# Patient Record
Sex: FEMALE | Race: WHITE | NOT HISPANIC OR LATINO | Employment: FULL TIME | URBAN - METROPOLITAN AREA
[De-identification: names, ages, dates, MRNs, and addresses within clinical notes are randomized per-mention and may not be internally consistent; named-entity substitution may affect disease eponyms.]

---

## 2017-04-18 ENCOUNTER — ALLSCRIPTS OFFICE VISIT (OUTPATIENT)
Dept: OTHER | Facility: OTHER | Age: 47
End: 2017-04-18

## 2017-05-01 ENCOUNTER — ALLSCRIPTS OFFICE VISIT (OUTPATIENT)
Dept: OTHER | Facility: OTHER | Age: 47
End: 2017-05-01

## 2017-05-11 ENCOUNTER — TRANSCRIBE ORDERS (OUTPATIENT)
Dept: ADMINISTRATIVE | Facility: HOSPITAL | Age: 47
End: 2017-05-11

## 2017-05-11 ENCOUNTER — HOSPITAL ENCOUNTER (OUTPATIENT)
Dept: RADIOLOGY | Facility: HOSPITAL | Age: 47
Discharge: HOME | End: 2017-05-11
Attending: ORTHOPAEDIC SURGERY
Payer: OTHER MISCELLANEOUS

## 2017-05-11 ENCOUNTER — APPOINTMENT (OUTPATIENT)
Dept: PREADMISSION TESTING | Facility: HOSPITAL | Age: 47
End: 2017-05-11
Payer: OTHER MISCELLANEOUS

## 2017-05-11 ENCOUNTER — APPOINTMENT (OUTPATIENT)
Dept: LAB | Facility: HOSPITAL | Age: 47
End: 2017-05-11
Attending: ORTHOPAEDIC SURGERY
Payer: OTHER MISCELLANEOUS

## 2017-05-11 VITALS
WEIGHT: 293 LBS | SYSTOLIC BLOOD PRESSURE: 114 MMHG | HEIGHT: 65 IN | BODY MASS INDEX: 48.82 KG/M2 | DIASTOLIC BLOOD PRESSURE: 73 MMHG

## 2017-05-11 DIAGNOSIS — G56.02 CARPAL TUNNEL SYNDROME ON LEFT: ICD-10-CM

## 2017-05-11 DIAGNOSIS — G56.02 CARPAL TUNNEL SYNDROME ON LEFT: Primary | ICD-10-CM

## 2017-05-11 LAB
ANION GAP SERPL CALCULATED.3IONS-SCNC: 5 MMOL/L (ref 4–13)
BASOPHILS # BLD AUTO: 0.03 THOUSANDS/ΜL (ref 0–0.1)
BASOPHILS NFR BLD AUTO: 0 % (ref 0–1)
BUN SERPL-MCNC: 11 MG/DL (ref 5–25)
CALCIUM SERPL-MCNC: 9 MG/DL (ref 8.3–10.1)
CHLORIDE SERPL-SCNC: 103 MMOL/L (ref 100–108)
CO2 SERPL-SCNC: 31 MMOL/L (ref 21–32)
CREAT SERPL-MCNC: 0.59 MG/DL (ref 0.6–1.3)
EOSINOPHIL # BLD AUTO: 0.33 THOUSAND/ΜL (ref 0–0.61)
EOSINOPHIL NFR BLD AUTO: 4 % (ref 0–6)
ERYTHROCYTE [DISTWIDTH] IN BLOOD BY AUTOMATED COUNT: 13.8 % (ref 11.6–15.1)
GFR SERPL CREATININE-BSD FRML MDRD: >60 ML/MIN/1.73SQ M
GLUCOSE P FAST SERPL-MCNC: 172 MG/DL (ref 65–99)
HCT VFR BLD AUTO: 42.4 % (ref 34.8–46.1)
HGB BLD-MCNC: 13.9 G/DL (ref 11.5–15.4)
LYMPHOCYTES # BLD AUTO: 2.1 THOUSANDS/ΜL (ref 0.6–4.47)
LYMPHOCYTES NFR BLD AUTO: 23 % (ref 14–44)
MCH RBC QN AUTO: 28.5 PG (ref 26.8–34.3)
MCHC RBC AUTO-ENTMCNC: 32.8 G/DL (ref 31.4–37.4)
MCV RBC AUTO: 87 FL (ref 82–98)
MONOCYTES # BLD AUTO: 0.61 THOUSAND/ΜL (ref 0.17–1.22)
MONOCYTES NFR BLD AUTO: 7 % (ref 4–12)
NEUTROPHILS # BLD AUTO: 6.15 THOUSANDS/ΜL (ref 1.85–7.62)
NEUTS SEG NFR BLD AUTO: 66 % (ref 43–75)
PLATELET # BLD AUTO: 247 THOUSANDS/UL (ref 149–390)
PMV BLD AUTO: 8.8 FL (ref 8.9–12.7)
POTASSIUM SERPL-SCNC: 4.2 MMOL/L (ref 3.5–5.3)
RBC # BLD AUTO: 4.87 MILLION/UL (ref 3.81–5.12)
SODIUM SERPL-SCNC: 139 MMOL/L (ref 136–145)
WBC # BLD AUTO: 9.22 THOUSAND/UL (ref 4.31–10.16)

## 2017-05-11 PROCEDURE — 85025 COMPLETE CBC W/AUTO DIFF WBC: CPT

## 2017-05-11 PROCEDURE — 36415 COLL VENOUS BLD VENIPUNCTURE: CPT

## 2017-05-11 PROCEDURE — 71020 HB CHEST X-RAY 2VW FRONTAL&LATL: CPT

## 2017-05-11 PROCEDURE — 80048 BASIC METABOLIC PNL TOTAL CA: CPT

## 2017-05-11 RX ORDER — ACETAMINOPHEN, ASPIRIN AND CAFFEINE 250; 250; 65 MG/1; MG/1; MG/1
2 TABLET, FILM COATED ORAL EVERY 8 HOURS PRN
COMMUNITY

## 2017-05-11 RX ORDER — DIPHENHYDRAMINE HCL 25 MG
25 CAPSULE ORAL
COMMUNITY

## 2017-05-17 ENCOUNTER — ALLSCRIPTS OFFICE VISIT (OUTPATIENT)
Dept: OTHER | Facility: OTHER | Age: 47
End: 2017-05-17

## 2017-05-17 DIAGNOSIS — E11.65 TYPE 2 DIABETES MELLITUS WITH HYPERGLYCEMIA (HCC): ICD-10-CM

## 2017-05-17 DIAGNOSIS — Z47.89 ENCOUNTER FOR OTHER ORTHOPEDIC AFTERCARE: ICD-10-CM

## 2017-05-17 DIAGNOSIS — R73.01 IMPAIRED FASTING GLUCOSE: ICD-10-CM

## 2017-05-18 ENCOUNTER — GENERIC CONVERSION - ENCOUNTER (OUTPATIENT)
Dept: OTHER | Facility: OTHER | Age: 47
End: 2017-05-18

## 2017-05-18 ENCOUNTER — LAB CONVERSION - ENCOUNTER (OUTPATIENT)
Dept: OTHER | Facility: OTHER | Age: 47
End: 2017-05-18

## 2017-05-18 ENCOUNTER — ALLSCRIPTS OFFICE VISIT (OUTPATIENT)
Dept: OTHER | Facility: OTHER | Age: 47
End: 2017-05-18

## 2017-05-18 LAB — HBA1C MFR BLD HPLC: 8.1 % OF TOTAL HGB

## 2017-05-26 ENCOUNTER — ANESTHESIA (OUTPATIENT)
Dept: PERIOP | Facility: HOSPITAL | Age: 47
End: 2017-05-26
Payer: OTHER MISCELLANEOUS

## 2017-05-26 ENCOUNTER — HOSPITAL ENCOUNTER (OUTPATIENT)
Facility: HOSPITAL | Age: 47
Setting detail: OUTPATIENT SURGERY
Discharge: HOME | End: 2017-05-26
Attending: ORTHOPAEDIC SURGERY | Admitting: ORTHOPAEDIC SURGERY
Payer: OTHER MISCELLANEOUS

## 2017-05-26 ENCOUNTER — ANESTHESIA EVENT (OUTPATIENT)
Dept: PERIOP | Facility: HOSPITAL | Age: 47
End: 2017-05-26
Payer: OTHER MISCELLANEOUS

## 2017-05-26 VITALS
TEMPERATURE: 97.7 F | HEIGHT: 65 IN | RESPIRATION RATE: 16 BRPM | DIASTOLIC BLOOD PRESSURE: 58 MMHG | BODY MASS INDEX: 48.82 KG/M2 | WEIGHT: 293 LBS | HEART RATE: 86 BPM | OXYGEN SATURATION: 98 % | SYSTOLIC BLOOD PRESSURE: 129 MMHG

## 2017-05-26 LAB — EXT PREGNANCY TEST URINE: NEGATIVE

## 2017-05-26 PROCEDURE — 81025 URINE PREGNANCY TEST: CPT | Performed by: ORTHOPAEDIC SURGERY

## 2017-05-26 RX ORDER — SODIUM CHLORIDE, SODIUM LACTATE, POTASSIUM CHLORIDE, CALCIUM CHLORIDE 600; 310; 30; 20 MG/100ML; MG/100ML; MG/100ML; MG/100ML
20 INJECTION, SOLUTION INTRAVENOUS CONTINUOUS
Status: DISCONTINUED | OUTPATIENT
Start: 2017-05-26 | End: 2017-05-26 | Stop reason: HOSPADM

## 2017-05-26 RX ORDER — GLYCOPYRROLATE 0.2 MG/ML
INJECTION INTRAMUSCULAR; INTRAVENOUS AS NEEDED
Status: DISCONTINUED | OUTPATIENT
Start: 2017-05-26 | End: 2017-05-26 | Stop reason: SURG

## 2017-05-26 RX ORDER — FENTANYL CITRATE/PF 50 MCG/ML
25 SYRINGE (ML) INJECTION
Status: DISCONTINUED | OUTPATIENT
Start: 2017-05-26 | End: 2017-05-26 | Stop reason: HOSPADM

## 2017-05-26 RX ORDER — FENTANYL CITRATE 50 UG/ML
INJECTION, SOLUTION INTRAMUSCULAR; INTRAVENOUS AS NEEDED
Status: DISCONTINUED | OUTPATIENT
Start: 2017-05-26 | End: 2017-05-26 | Stop reason: SURG

## 2017-05-26 RX ORDER — LIDOCAINE HYDROCHLORIDE 5 MG/ML
INJECTION, SOLUTION INFILTRATION; PERINEURAL AS NEEDED
Status: DISCONTINUED | OUTPATIENT
Start: 2017-05-26 | End: 2017-05-26 | Stop reason: SURG

## 2017-05-26 RX ORDER — ONDANSETRON 2 MG/ML
INJECTION INTRAMUSCULAR; INTRAVENOUS AS NEEDED
Status: DISCONTINUED | OUTPATIENT
Start: 2017-05-26 | End: 2017-05-26 | Stop reason: SURG

## 2017-05-26 RX ORDER — PROPOFOL 10 MG/ML
INJECTION, EMULSION INTRAVENOUS AS NEEDED
Status: DISCONTINUED | OUTPATIENT
Start: 2017-05-26 | End: 2017-05-26 | Stop reason: SURG

## 2017-05-26 RX ORDER — KETAMINE HYDROCHLORIDE 50 MG/ML
INJECTION, SOLUTION, CONCENTRATE INTRAMUSCULAR; INTRAVENOUS AS NEEDED
Status: DISCONTINUED | OUTPATIENT
Start: 2017-05-26 | End: 2017-05-26 | Stop reason: SURG

## 2017-05-26 RX ORDER — MIDAZOLAM HYDROCHLORIDE 1 MG/ML
INJECTION INTRAMUSCULAR; INTRAVENOUS AS NEEDED
Status: DISCONTINUED | OUTPATIENT
Start: 2017-05-26 | End: 2017-05-26 | Stop reason: SURG

## 2017-05-26 RX ORDER — TRAMADOL HYDROCHLORIDE 50 MG/1
50 TABLET ORAL EVERY 6 HOURS SCHEDULED
Status: DISCONTINUED | OUTPATIENT
Start: 2017-05-26 | End: 2017-05-26 | Stop reason: HOSPADM

## 2017-05-26 RX ORDER — ACETAMINOPHEN 325 MG/1
650 TABLET ORAL EVERY 6 HOURS PRN
Status: DISCONTINUED | OUTPATIENT
Start: 2017-05-26 | End: 2017-05-26 | Stop reason: HOSPADM

## 2017-05-26 RX ORDER — KETOROLAC TROMETHAMINE 30 MG/ML
30 INJECTION, SOLUTION INTRAMUSCULAR; INTRAVENOUS ONCE
Status: COMPLETED | OUTPATIENT
Start: 2017-05-26 | End: 2017-05-26

## 2017-05-26 RX ORDER — ACETAMINOPHEN AND CODEINE PHOSPHATE 300; 30 MG/1; MG/1
1-2 TABLET ORAL EVERY 6 HOURS PRN
Qty: 15 TABLET | Refills: 0 | Status: SHIPPED | OUTPATIENT
Start: 2017-05-26 | End: 2017-06-05

## 2017-05-26 RX ORDER — ONDANSETRON 2 MG/ML
4 INJECTION INTRAMUSCULAR; INTRAVENOUS ONCE
Status: DISCONTINUED | OUTPATIENT
Start: 2017-05-26 | End: 2017-05-26 | Stop reason: HOSPADM

## 2017-05-26 RX ORDER — ONDANSETRON 2 MG/ML
4 INJECTION INTRAMUSCULAR; INTRAVENOUS EVERY 6 HOURS PRN
Status: DISCONTINUED | OUTPATIENT
Start: 2017-05-26 | End: 2017-05-26 | Stop reason: HOSPADM

## 2017-05-26 RX ADMIN — MIDAZOLAM HYDROCHLORIDE 4 MG: 1 INJECTION, SOLUTION INTRAMUSCULAR; INTRAVENOUS at 07:25

## 2017-05-26 RX ADMIN — FENTANYL CITRATE 100 MCG: 50 INJECTION, SOLUTION INTRAMUSCULAR; INTRAVENOUS at 07:25

## 2017-05-26 RX ADMIN — KETOROLAC TROMETHAMINE 30 MG: 30 INJECTION, SOLUTION INTRAMUSCULAR at 08:22

## 2017-05-26 RX ADMIN — PROPOFOL 50 MG: 10 INJECTION, EMULSION INTRAVENOUS at 07:45

## 2017-05-26 RX ADMIN — SODIUM CHLORIDE, SODIUM LACTATE, POTASSIUM CHLORIDE, AND CALCIUM CHLORIDE: .6; .31; .03; .02 INJECTION, SOLUTION INTRAVENOUS at 07:15

## 2017-05-26 RX ADMIN — ONDANSETRON 4 MG: 2 INJECTION INTRAMUSCULAR; INTRAVENOUS at 07:51

## 2017-05-26 RX ADMIN — KETAMINE HYDROCHLORIDE 25 MG: 50 INJECTION INTRAMUSCULAR; INTRAVENOUS at 07:45

## 2017-05-26 RX ADMIN — SODIUM CHLORIDE, SODIUM LACTATE, POTASSIUM CHLORIDE, AND CALCIUM CHLORIDE 20 ML/HR: .6; .31; .03; .02 INJECTION, SOLUTION INTRAVENOUS at 06:45

## 2017-05-26 RX ADMIN — FENTANYL CITRATE 100 MCG: 50 INJECTION, SOLUTION INTRAMUSCULAR; INTRAVENOUS at 07:45

## 2017-05-26 RX ADMIN — CEFAZOLIN SODIUM 1000 MG: 1 SOLUTION INTRAVENOUS at 07:20

## 2017-05-26 RX ADMIN — TRAMADOL HYDROCHLORIDE 50 MG: 50 TABLET, FILM COATED ORAL at 08:19

## 2017-05-26 RX ADMIN — GLYCOPYRROLATE 0.2 MG: 0.2 INJECTION, SOLUTION INTRAMUSCULAR; INTRAVENOUS at 07:46

## 2017-05-26 RX ADMIN — PROPOFOL 50 MG: 10 INJECTION, EMULSION INTRAVENOUS at 07:30

## 2017-05-26 RX ADMIN — LIDOCAINE HYDROCHLORIDE 40 ML: 5 INJECTION, SOLUTION INFILTRATION; PERINEURAL at 07:29

## 2017-05-26 RX ADMIN — KETAMINE HYDROCHLORIDE 25 MG: 50 INJECTION INTRAMUSCULAR; INTRAVENOUS at 07:40

## 2017-06-08 ENCOUNTER — ALLSCRIPTS OFFICE VISIT (OUTPATIENT)
Dept: OTHER | Facility: OTHER | Age: 47
End: 2017-06-08

## 2017-07-10 ENCOUNTER — ALLSCRIPTS OFFICE VISIT (OUTPATIENT)
Dept: OTHER | Facility: OTHER | Age: 47
End: 2017-07-10

## 2017-07-10 DIAGNOSIS — M79.645 PAIN OF FINGER OF LEFT HAND: ICD-10-CM

## 2017-07-10 DIAGNOSIS — Z47.89 ENCOUNTER FOR OTHER ORTHOPEDIC AFTERCARE: ICD-10-CM

## 2017-08-04 ENCOUNTER — ALLSCRIPTS OFFICE VISIT (OUTPATIENT)
Dept: OTHER | Facility: OTHER | Age: 47
End: 2017-08-04

## 2017-08-04 ENCOUNTER — APPOINTMENT (OUTPATIENT)
Dept: RADIOLOGY | Facility: CLINIC | Age: 47
End: 2017-08-04
Payer: OTHER MISCELLANEOUS

## 2017-08-04 DIAGNOSIS — M79.645 PAIN OF FINGER OF LEFT HAND: ICD-10-CM

## 2017-08-04 PROCEDURE — 73140 X-RAY EXAM OF FINGER(S): CPT

## 2017-08-14 DIAGNOSIS — Z47.89 ENCOUNTER FOR OTHER ORTHOPEDIC AFTERCARE: ICD-10-CM

## 2017-09-18 ENCOUNTER — ALLSCRIPTS OFFICE VISIT (OUTPATIENT)
Dept: OTHER | Facility: OTHER | Age: 47
End: 2017-09-18

## 2017-09-18 DIAGNOSIS — G56.22 LESION OF LEFT ULNAR NERVE: ICD-10-CM

## 2017-09-27 ENCOUNTER — APPOINTMENT (OUTPATIENT)
Dept: PHYSICAL THERAPY | Facility: CLINIC | Age: 47
End: 2017-09-27
Payer: OTHER MISCELLANEOUS

## 2017-09-27 ENCOUNTER — GENERIC CONVERSION - ENCOUNTER (OUTPATIENT)
Dept: OTHER | Facility: OTHER | Age: 47
End: 2017-09-27

## 2017-09-27 DIAGNOSIS — G56.22 LESION OF LEFT ULNAR NERVE: ICD-10-CM

## 2017-09-27 PROCEDURE — G8986 CARRY D/C STATUS: HCPCS

## 2017-09-27 PROCEDURE — G8985 CARRY GOAL STATUS: HCPCS

## 2017-09-27 PROCEDURE — L3702 EO W/O JOINTS CF: HCPCS

## 2017-09-27 PROCEDURE — G8984 CARRY CURRENT STATUS: HCPCS

## 2017-10-23 ENCOUNTER — GENERIC CONVERSION - ENCOUNTER (OUTPATIENT)
Dept: OTHER | Facility: OTHER | Age: 47
End: 2017-10-23

## 2017-11-20 ENCOUNTER — ALLSCRIPTS OFFICE VISIT (OUTPATIENT)
Dept: OTHER | Facility: OTHER | Age: 47
End: 2017-11-20

## 2017-11-21 NOTE — PROGRESS NOTES
Assessment    1  Cubital tunnel syndrome on left (354 2) (G56 22)   2  Thumb pain, left (729 5) (G82 721)    Plan  Cubital tunnel syndrome on left    · EMG ONE EXTREMITY WITH OR W/O RELATED PARASPINAL AREAS; Status:Hold For- Scheduling; Requested for:20Nov2017;   ONE : LUE  Thumb pain, left    · * MRI HAND LEFT WO CONTRAST; Status:Need Information - Financial Authorization; Requested for:20Nov2017;    · * MRI WRIST LEFT WO CONTRAST; Status:Need Information - Financial Authorization; Requested for:20Nov2017;     Discussion/Summary    At this time, patient continues to work with no restrictions  will obtain a repeat EMG of the left upper extremity as well as a left MRI of the hand and wristif condition worsensas needed pending EMG and MRI for reevaluation  attestation:  Tyler Hernandez am acting as a scribe while in the presence of the attending physician  Johnnie Mcwilliams MD, personally performed the services described in this documentation as scribed in my presence  Chief Complaint    1  Hand Problem  Patient for follow-up numbness tingling of her left hand times several months      History of Present Illness  HPI: Patient is a 60-year-old right-hand dominant, female who presents for follow-up numbness and tingling of her left hand, ring and small finger times several months  She was last seen in the office for the same condition on 10/18/17, and diagnosed with cubital tunnel syndrome on the left, primarily of left wrist osteoarthritis and left thumb pain  She continues to complain of left pillar pain and numbness and tingling in her left ring and small finger  She has completed occupational therapy, as well as, left elbow bracing with little to no relief of symptoms  is significant for left carpal tunnel release in May 2017 by Dr Janelle Guerin, surgical, family, and social history as well as medications and allergies were reviewed  Updates as needed were performed   Review of systems was recorded on a separate intake sheet, this was reviewed as well  Review of Systems    ROS reviewed  Active Problems    1  Aftercare following surgery of the musculoskeletal system (V58 78) (Z47 89)   2  Cubital tunnel syndrome on left (354 2) (G56 22)   3  Localized primary osteoarthritis of left wrist (715 13) (M19 032)   4  Thumb pain, left (729 5) (M79 645)   5  Uncontrolled type 2 diabetes mellitus without complication, without long-term current use of insulin (250 02) (E11 65)    Current Meds   1  Benadryl Allergy 25 MG Oral Tablet; TAKE 1 TABLET AT BEDTIME; Therapy: 14NVS5808 to Recorded   2  Calcium Carbonate Antacid 500 MG Oral Tablet Chewable; TAKE AS DIRECTED; Therapy: 51MQP1253 to Recorded   3  Excedrin Migraine 250-250-65 MG Oral Tablet; TAKE 1 TABLET 3 TIMES DAILY AS NEEDED; Therapy: 29FLH8977 to Recorded   4  Lifescan One Touch Ultra Test Strips; TESTING THREE TIMES DAILY; Therapy: 70ZFX3716 to (Last Rx:19May2017)  Requested for: 41XPU6768 Ordered   5  Lifescan One Touch Ultramini Meter; 1 meter testing three times daily; Therapy: 23FVJ9972 to (Last Rx:19May2017)  Requested for: 80VLY4192 Ordered   6  MetFORMIN HCl - 850 MG Oral Tablet; Take 1 tablet twice daily; Therapy: 64UIN0278 to (Evaluate:18Nov2017)  Requested for: 39UIE4199; Last Rx:47Rvd0315 Ordered   7  Pain Relief Oral Tablet; Therapy: 61Dpv4011 to Recorded   8  TRUEplus Lancets 28G Miscellaneous; TESTING THREE TIMES DAILY; Therapy: 18WFS9222 to (Last Rx:19May2017)  Requested for: 16HKF6281 Ordered    Allergies  1  No Known Drug Allergies    Vitals  Signs     Heart Rate: 98  Systolic: 268  Diastolic: 85  Height: 5 ft 6 in  Weight: 340 lb 4 oz  BMI Calculated: 54 92  BSA Calculated: 2 51    Physical Exam     General: No acute distress, age-appropriate  Neck: Supple, trachea midline  HEENT: Normocephalic atraumatic, mucous membranes are moist, sclera are nonicteric  Cardiovascular: No discernable arrhythmia    Respiratory: Breathing is even and unlabored, no stridor or audible wheezing  Cervical Spine: was evaluated and demonstrated: Full Range of Motion  Negative Spurlings, Negative Lhermitte's, Negative Salazar's  No C5 , No C6 , No C7, No C8 weaknesses  L Wrist Examination:  Full range of motion at the wrist  2+ radial and ulnar pulses    Left Cubital Tunnel: Tinelâs at Elbow, but Negative Subluxing Ulnar Nerve, No Weakness Intrinsics, No Hypothenar Atrophy, No Clawing and No Atrophy to First dorsal interosseous  Carpal Tunnel Examination-       Signatures   Electronically signed by : AJ Smith; Nov 20 2017  4:16PM EST                       (Author)    Electronically signed by : HAROON Mendiola ; Nov 20 2017  4:21PM EST                       (Author)

## 2018-01-08 ENCOUNTER — ALLSCRIPTS OFFICE VISIT (OUTPATIENT)
Dept: OTHER | Facility: OTHER | Age: 48
End: 2018-01-08

## 2018-01-09 NOTE — PROGRESS NOTES
Assessment   1  Thumb pain, left (729 5) (W71 172)   2  Localized primary osteoarthritis of left wrist (715 13) (L15 297)    Plan   Localized primary osteoarthritis of left wrist    · Wrist splint; Status:Complete;   Done: 79ECE2580    Discussion/Summary      Jodi Mccoy continues with discomfort at the base of the left thumb  It is likely that she has had significant improvement in her median neuropathy and is now experiencing the osteoarthritic type pain at the base of the left thumb  Conservative versus surgical treatment options including over-the-counter nonsteroidals, cortisone injection, joint arthroplasty/fusion and arthroscopic debridement was fairly discussed with her  At this point recommendations are made for conservative measures as Britney has not yet had any treatment localized to her left Aia 16 joint  She wishes to obtain a comfort form brace today for her left thumb  She will wear this as tolerated and will begin over-the-counter nonsteroidal anti-inflammatories  Leave her follow-up open ended as she understands the treatment options available to her pending her pain and discomfort level  Ruperto Bekcford, acted as scribe during today's visit the present of the attending surgeon  Attestation:   Chacorta Romero MD, personally performed the services described in this documentation as scribed in my presence  Chief Complaint   Left thumb pain      History of Present Illness   HPI: 81-year-old female who is here for evaluation of her left hand  She is status post left carpal tunnel release that was done in May 2017 by Dr Loraine Morel  Prior to that surgery she was having whole hand pain and numbness  She followed up appropriately with Dr Loraine Morel but over the course of months continues to have aching at the base of the left thumb  She also has occasional tingling in the fourth and fifth fingers  An EMG and an MRI were both ordered and she was sent here upon Dr Loraine Morel 's recommendation for further evaluation  She is back to work with pain  Review of Systems        Constitutional: No fever, no chills, feels well, no tiredness, no recent weight gain or loss  Eyes: No complaints of eyesight problems, no red eyes  ENT: no loss of hearing, no nosebleeds, no sore throat  Cardiovascular: No complaints of chest pain, no palpitations, no leg claudication or lower extremity edema  Respiratory: no compliants of shortness of breath, no wheezing, no cough  Gastrointestinal: no complaints of abdominal pain, no constipation, no nausea or diarrhea, no vomiting, no bloody stools  Genitourinary: no complaints of dysuria, no incontinence  Musculoskeletal: as noted in HPI  Integumentary: no complaints of skin rash or lesion, no itching or dry skin, no skin wounds  Neurological: no complaints of headache, no confusion, no numbness or tingling, no dizziness  Endocrine: No complaints of muscle weakness, no feelings of weakness, no frequent urination, no excessive thirst       Psychiatric: No suicidal thoughts, no anxiety, no feelings of depression  ROS reviewed  Active Problems   1  Aftercare following surgery of the musculoskeletal system (V58 78) (Z47 89)   2  Cubital tunnel syndrome on left (354 2) (G56 22)   3  Localized primary osteoarthritis of left wrist (715 13) (M19 032)   4  Thumb pain, left (729 5) (M79 645)   5  Uncontrolled type 2 diabetes mellitus without complication, without long-term current use     of insulin (250 02) (E11 65)    Past Medical History      The active problems and past medical history were reviewed and updated today  Surgical History      The surgical history was reviewed and updated today  Family History      The family history was reviewed and updated today  Social History   The social history was reviewed and updated today  Current Meds    1  Benadryl Allergy 25 MG Oral Tablet; TAKE 1 TABLET AT BEDTIME;      Therapy: 37WYM1840 to Recorded   2  Calcium Carbonate Antacid 500 MG Oral Tablet Chewable; TAKE AS DIRECTED; Therapy: 67NYR8305 to Recorded   3  Excedrin Migraine 250-250-65 MG Oral Tablet; TAKE 1 TABLET 3 TIMES DAILY AS     NEEDED; Therapy: 80EZF5978 to Recorded   4  Lifescan One Touch Ultra Test Strips; TESTING THREE TIMES DAILY; Therapy: 70MVX5846 to (Last Rx:19May2017)  Requested for: 01BFU2242 Ordered   5  Lifescan One Touch Ultramini Meter; 1 meter testing three times daily; Therapy: 50JOX2057 to (Last Rx:19May2017)  Requested for: 00XWN6530 Ordered   6  MetFORMIN HCl - 850 MG Oral Tablet; Take 1 tablet twice daily; Therapy: 46XIR9728 to (Evaluate:18Nov2017)  Requested for: 39IMN8940; Last     Rx:22May2017 Ordered   7  Pain Relief Oral Tablet; Therapy: 59Ckl4141 to Recorded   8  TRUEplus Lancets 28G Miscellaneous; TESTING THREE TIMES DAILY; Therapy: 77YWP9144 to (Last Rx:19May2017)  Requested for: 41AEL8736 Ordered     The medication list was reviewed and updated today  Allergies   1  No Known Drug Allergies    Vitals   Signs   Heart Rate: 93  Systolic: 035  Diastolic: 99  Height: 5 ft 6 in  Weight: 336 lb 2 oz  BMI Calculated: 54 25  BSA Calculated: 2 49    Physical Exam           General: No acute distress, age-appropriate  No increased work of breathing, no appreciable dysrhythmias, skin is intact, mood and affect are appropriate  L Elbow: was evaluated and demonstrated : Full Range of Motion, Negative Pivot Shift Test, Negative Milking Test, Negative Valgus Hyperextension Test, Negative Hangar Sign  No Valgus instability, No Varus Instability, No Posteromedial Instability, No Posterolateral Instability  No Olecranon Bursitis, No Crepitus, No Tenderness  R Elbow: was evaluated and demonstrated : Full Range of Motion, Negative Pivot Shift Test, Negative Milking Test, Negative Valgus Hyperextension Test, Negative Hangar Sign   No Valgus instability, No Varus Instability, No Posteromedial Instability, No Posterolateral Instability  No Olecranon Bursitis, No Crepitus, No Tenderness  L Wrist Examination:      Left wrist exam reveals full range of motion without pain  There is tenderness along the left CMC joint to palpation and minimal tenderness over the anatomical snuffbox  Negative grind  Negative Tinel's to the carpal tunnel and mild reproducible pain into the left thumb with Phalen's/compression over the median nerve  Left Cubital Tunnel: was evaluated and demonstrated: Negative Tinelâs at the elbow, negative Tinelâs at Guyonâs canal in the wrist, negative Elbow flexion/compression test, no subluxation of the ulnar nerve, no weakness of the FDP to ring/small, Full strength to the intrinsics, full strength to the first dorsal interosseous, negative Fromentâs sign, negative Jeaneâs sign, No hypothenar atrophy, No tenderness at [facility], Negative wartenburgâs sign, no clawing, no atrophy to the first dorsal interosseous muscle, no atrophy to the intrinsic muscles  Skin: was evaluated and demonstrated no masses, no abrasions, no erythema, no effusion, no edema, no fluctuance, no induration, no laceration, no ulceration, no lymphadenopathy  Left Upper Neurovascular: were evaluated and demonstrated: The patient has normal motor strength to the median, ulnar and radial nerve  Normal sensation to the median, ulnar, and radial nerve  Normal pulses in the radial and ulnar artery  Capillary refill is < 2 seconds  Results/Data   I personally reviewed the films/images/results in the office today  My interpretation follows  MRI Review MRI of the left wrist and hand does confirm a mild to moderate carpometacarpal osteoarthritis at the base of the thumb  As well as a cyst in the distal scaphoid  Diagnostic Review Recent MRI from December 2017 was compared to EMG done in April prior to carpal tunnel release   There is definite improvement in all measurements including motor and sensory median latency and amplitude  No evidence of cubital tunnel or ulnar neuropathy is present        Signatures    Electronically signed by : LIUDMILA Hernandez; Jan 8 2018  2:57PM EST                       (/Recorder)     Electronically signed by : HAROON Park ; Jan 8 2018  3:04PM EST                       (Author)

## 2018-01-11 NOTE — RESULT NOTES
Verified Results  (Q) HEMOGLOBIN A1c 32CPK7610 02:21PM Raizatenzin Puneet   REPORT COMMENT:  FASTING:NO     Test Name Result Flag Reference   HEMOGLOBIN A1c 8 1 % of total Hgb H <5 7   For someone without known diabetes, a hemoglobin A1c  value of 6 5% or greater indicates that they may have   diabetes and this should be confirmed with a follow-up   test      For someone with known diabetes, a value <7% indicates   that their diabetes is well controlled and a value   greater than or equal to 7% indicates suboptimal   control  A1c targets should be individualized based on   duration of diabetes, age, comorbid conditions, and   other considerations  Currently, no consensus exists regarding use of  hemoglobin A1c for diagnosis of diabetes for children

## 2018-01-12 VITALS
DIASTOLIC BLOOD PRESSURE: 85 MMHG | HEART RATE: 98 BPM | SYSTOLIC BLOOD PRESSURE: 129 MMHG | WEIGHT: 293 LBS | HEIGHT: 66 IN | BODY MASS INDEX: 47.09 KG/M2

## 2018-01-13 VITALS
HEART RATE: 97 BPM | DIASTOLIC BLOOD PRESSURE: 85 MMHG | HEIGHT: 66 IN | SYSTOLIC BLOOD PRESSURE: 139 MMHG | BODY MASS INDEX: 47.09 KG/M2 | WEIGHT: 293 LBS

## 2018-01-13 VITALS
DIASTOLIC BLOOD PRESSURE: 74 MMHG | HEART RATE: 70 BPM | SYSTOLIC BLOOD PRESSURE: 130 MMHG | HEIGHT: 65 IN | WEIGHT: 293 LBS | BODY MASS INDEX: 48.82 KG/M2

## 2018-01-13 VITALS
DIASTOLIC BLOOD PRESSURE: 72 MMHG | BODY MASS INDEX: 48.82 KG/M2 | SYSTOLIC BLOOD PRESSURE: 134 MMHG | HEART RATE: 64 BPM | WEIGHT: 293 LBS | HEIGHT: 65 IN

## 2018-01-14 VITALS
BODY MASS INDEX: 48.82 KG/M2 | HEART RATE: 91 BPM | DIASTOLIC BLOOD PRESSURE: 80 MMHG | WEIGHT: 293 LBS | HEIGHT: 65 IN | SYSTOLIC BLOOD PRESSURE: 120 MMHG

## 2018-01-14 VITALS
BODY MASS INDEX: 48.82 KG/M2 | HEART RATE: 72 BPM | SYSTOLIC BLOOD PRESSURE: 146 MMHG | WEIGHT: 293 LBS | HEIGHT: 65 IN | DIASTOLIC BLOOD PRESSURE: 73 MMHG

## 2018-01-14 VITALS
HEART RATE: 90 BPM | HEIGHT: 65 IN | WEIGHT: 293 LBS | SYSTOLIC BLOOD PRESSURE: 138 MMHG | DIASTOLIC BLOOD PRESSURE: 86 MMHG | BODY MASS INDEX: 48.82 KG/M2

## 2018-01-15 VITALS
BODY MASS INDEX: 48.82 KG/M2 | DIASTOLIC BLOOD PRESSURE: 86 MMHG | WEIGHT: 293 LBS | SYSTOLIC BLOOD PRESSURE: 136 MMHG | HEART RATE: 92 BPM | HEIGHT: 65 IN

## 2018-01-15 VITALS
HEIGHT: 65 IN | DIASTOLIC BLOOD PRESSURE: 85 MMHG | WEIGHT: 293 LBS | BODY MASS INDEX: 48.82 KG/M2 | SYSTOLIC BLOOD PRESSURE: 137 MMHG | HEART RATE: 93 BPM

## 2018-01-22 VITALS
WEIGHT: 293 LBS | HEART RATE: 93 BPM | SYSTOLIC BLOOD PRESSURE: 160 MMHG | BODY MASS INDEX: 47.09 KG/M2 | DIASTOLIC BLOOD PRESSURE: 99 MMHG | HEIGHT: 66 IN

## (undated) DEVICE — CHLORAPREP HI-LITE 26ML ORANGE

## (undated) DEVICE — GLOVE SRG BIOGEL ORTHOPEDIC 7.5

## (undated) DEVICE — GLOVE SRG BIOGEL 7

## (undated) DEVICE — INTENDED FOR TISSUE SEPARATION, AND OTHER PROCEDURES THAT REQUIRE A SHARP SURGICAL BLADE TO PUNCTURE OR CUT.: Brand: BARD-PARKER SAFETY BLADES SIZE 15, STERILE

## (undated) DEVICE — DISPOSABLE TOURNIQUET CUFF DUAL BLADDER, DUAL PORT AND QUICK CONNECT CONNECTOR: Brand: COLOR CUFF

## (undated) DEVICE — NEEDLE 25G X 1 1/2

## (undated) DEVICE — PACK PLASTIC HAND PBDS

## (undated) DEVICE — SUT ETHILON 4-0 PS-2 18 IN 1667H

## (undated) DEVICE — GLOVE SRG BIOGEL 7.5

## (undated) DEVICE — INTENDED FOR TISSUE SEPARATION, AND OTHER PROCEDURES THAT REQUIRE A SHARP SURGICAL BLADE TO PUNCTURE OR CUT.: Brand: BARD-PARKER ® CARBON RIB-BACK BLADES

## (undated) DEVICE — GLOVE INDICATOR PI UNDERGLOVE SZ 7.5 BLUE